# Patient Record
(demographics unavailable — no encounter records)

---

## 2025-07-08 NOTE — HISTORY OF PRESENT ILLNESS
[de-identified] : Patient Name: BENTLEY ALVARADO  MRN: [id number]  Referring Provider: JENNIE STYLES MD  Pulmonologist: Dr. Aguilar ([facility]) Date: 07/08/2025   Diagnosis: Pancreas lesion  78 year old male presents for evaluation of a pancreatic lesion. Pt had a chest CT for routine evaluation of his emphysema, and it incidentally showed some renal cysts and he was referred for an abdominal ultrasound. 4/24/25 - US Abdomen showed cholelithiasis and a 0.7 cm cystic lesion at the pancreas head and bilateral simple renal cysts. 5/5/25  - CA 19-9 is 9, CEA is 2.8 5/23/25 - MRI showed a 1 cm lesion in the pancreatic neck/body  may represent a solid lesion 6/20/25 - EGD showed a hiatal hernia and distal esophagitis and gastritis  He was referred here for a surgical consultation.  Currently, Mr. ALVARADO denies abdominal pain or discomfort. He tolerates a regular diet, has regular bowel movements.   Functional Status: Mr. ALVARADO is able to walk a few blocks without fatigue or dyspnea.

## 2025-07-08 NOTE — ASSESSMENT
[FreeTextEntry1] : Impression) pancreas lesion  Plan) reviewed clinical data with the patient and family.  Has a lesion in the neck of the pancreas of unclear etiology.  Does not appear to be completely cystic.  Discussed with the patient and the family that the most definitive way to characterize the lesion would be with an endoscopic ultrasound and FNA guided biopsy.  There is a small chance this could be a PNET in which case a dotatate scan could potentially make the diagnosis if that lesion is positive on dotatate scan but if the dotatate scan were negative then will be back to having to go for an EUS FNA.  Patient will think about options we will refer him to advanced GI to discuss the nature of an EUS/FNA and they will get back to us with a final decision on how they would like to proceed.  Lino Klein MD  Chief Surgical Oncology Multidisciplinary GI cancer program [facility]  Professor Surgery [facility] [employer] [facility]   CC Dr. Lowe  Time spent reviewing data and in consultation 40 minutes

## 2025-07-08 NOTE — HISTORY OF PRESENT ILLNESS
[de-identified] : Patient Name: BENTLEY ALVARADO  MRN: [id number]  Referring Provider: JENNIE STYLES MD  Pulmonologist: Dr. Aguilar ([facility]) Date: 07/08/2025   Diagnosis: Pancreas lesion  78 year old male presents for evaluation of a pancreatic lesion. Pt had a chest CT for routine evaluation of his emphysema, and it incidentally showed some renal cysts and he was referred for an abdominal ultrasound. 4/24/25 - US Abdomen showed cholelithiasis and a 0.7 cm cystic lesion at the pancreas head and bilateral simple renal cysts. 5/5/25  - CA 19-9 is 9, CEA is 2.8 5/23/25 - MRI showed a 1 cm lesion in the pancreatic neck/body  may represent a solid lesion 6/20/25 - EGD showed a hiatal hernia and distal esophagitis and gastritis  He was referred here for a surgical consultation.  Currently, Mr. ALVARADO denies abdominal pain or discomfort. He tolerates a regular diet, has regular bowel movements.   Functional Status: Mr. ALVARADO is able to walk a few blocks without fatigue or dyspnea.

## 2025-07-08 NOTE — ASSESSMENT
[FreeTextEntry1] : Impression) pancreas lesion  Plan) reviewed clinical data with the patient and family.  Has a lesion in the neck of the pancreas of unclear etiology.  Does not appear to be completely cystic.  Discussed with the patient and the family that the most definitive way to characterize the lesion would be with an endoscopic ultrasound and FNA guided biopsy.  There is a small chance this could be a PNET in which case a dotatate scan could potentially make the diagnosis if that lesion is positive on dotatate scan but if the dotatate scan were negative then will be back to having to go for an EUS FNA.  Patient will think about options we will refer him to advanced GI to discuss the nature of an EUS/FNA and they will get back to us with a final decision on how they would like to proceed.  Lino Klein MD  Chief Surgical Oncology Multidisciplinary GI cancer program [facility]  Professor Surgery [facility] [employer] [facility]   CC Dr. Lowe  Time spent reviewing data and in consultation 40 minutes